# Patient Record
(demographics unavailable — no encounter records)

---

## 2025-06-26 NOTE — PHYSICAL EXAM
[No Acute Distress] : no acute distress [Well Nourished] : well nourished [Well Developed] : well developed [Well-Appearing] : well-appearing [Normal Sclera/Conjunctiva] : normal sclera/conjunctiva [PERRL] : pupils equal round and reactive to light [EOMI] : extraocular movements intact [Normal Outer Ear/Nose] : the outer ears and nose were normal in appearance [Normal Oropharynx] : the oropharynx was normal [No JVD] : no jugular venous distention [No Lymphadenopathy] : no lymphadenopathy [Supple] : supple [Thyroid Normal, No Nodules] : the thyroid was normal and there were no nodules present [No Respiratory Distress] : no respiratory distress  [No Accessory Muscle Use] : no accessory muscle use [Clear to Auscultation] : lungs were clear to auscultation bilaterally [Normal Rate] : normal rate  [Regular Rhythm] : with a regular rhythm [Normal S1, S2] : normal S1 and S2 [No Carotid Bruits] : no carotid bruits [No Abdominal Bruit] : a ~M bruit was not heard ~T in the abdomen [No Varicosities] : no varicosities [Pedal Pulses Present] : the pedal pulses are present [No Edema] : there was no peripheral edema [No Palpable Aorta] : no palpable aorta [No Extremity Clubbing/Cyanosis] : no extremity clubbing/cyanosis [Normal Appearance] : normal in appearance [Soft] : abdomen soft [Non Tender] : non-tender [Non-distended] : non-distended [No Masses] : no abdominal mass palpated [No HSM] : no HSM [Normal Bowel Sounds] : normal bowel sounds [Normal Posterior Cervical Nodes] : no posterior cervical lymphadenopathy [Normal Anterior Cervical Nodes] : no anterior cervical lymphadenopathy [No CVA Tenderness] : no CVA  tenderness [No Spinal Tenderness] : no spinal tenderness [No Joint Swelling] : no joint swelling [Grossly Normal Strength/Tone] : grossly normal strength/tone [No Rash] : no rash [Coordination Grossly Intact] : coordination grossly intact [No Focal Deficits] : no focal deficits [Normal Gait] : normal gait [Deep Tendon Reflexes (DTR)] : deep tendon reflexes were 2+ and symmetric [Normal Affect] : the affect was normal [Normal Insight/Judgement] : insight and judgment were intact [de-identified] : 2/6 sys murmur

## 2025-06-26 NOTE — HISTORY OF PRESENT ILLNESS
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 She has not been sleeping well. Working days and transitioning to nights in 2 weeks. She was working in [employer] [facility].  Periods are regular. LMP = now.  c/o some hair loss over the last 4 yrs.

## 2025-06-26 NOTE — ASSESSMENT
[FreeTextEntry1] : hair loss refer to derm [Vaccines Reviewed] : Immunizations reviewed today. Please see immunization details in the vaccine log within the immunization flowsheet.

## 2025-06-26 NOTE — HEALTH RISK ASSESSMENT
[Very Good] : ~his/her~  mood as very good [Yes] : Yes [2 - 4 times a month (2 pts)] : 2-4 times a month (2 points) [1 or 2 (0 pts)] : 1 or 2 (0 points) [Never (0 pts)] : Never (0 points) [No] : In the past 12 months have you used drugs other than those required for medical reasons? No [Little interest or pleasure doing things] : 1) Little interest or pleasure doing things [Feeling down, depressed, or hopeless] : 2) Feeling down, depressed, or hopeless [0] : 2) Feeling down, depressed, or hopeless: Not at all (0) [PHQ-2 Negative - No further assessment needed] : PHQ-2 Negative - No further assessment needed [Audit-CScore] : 2 [[id number]] : 0 [Never] : Never

## 2025-07-15 NOTE — HISTORY OF PRESENT ILLNESS
[FreeTextEntry1] : hair thinning [de-identified] : hair thinning on scalp  noted more frontally no symptoms noticing more hair shedding after covid

## 2025-07-15 NOTE — ASSESSMENT
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 Discussed proper application to affected areas of scalp, avoidance of face due to risk of hypertrichosis, need for at least 3-6 months of consistent use to see effect, possible shedding with initial use and that results are dependent on continued use of product. discussed biopsy as well to clarify pattern - pt will trial topical minoxidil first

## 2025-07-16 NOTE — ASSESSMENT
[FreeTextEntry1] : I have asked ALEM to undergo detailed cardiac testing in order to evaluate her overall cardiovascular risk. An assessment of both structural and functional heart disease was recommended to the patient. In this regard, an echocardiogram and an event recorder were advised to the patient. I await the upcoming noninvasive cardiac testing in order to assess her overall cardiovascular risk. We discussed the pros and cons of plain treadmill stress testing nuclear stress testing and angiography including a sensitivity analysis. We discussed current ACC guidelines and the calculated 10 year risk is approximately 0.9% with a vascular age of 25 which is very low risk. This represents a moderate amount of medical decision making based upon two or more chronic illnesses More than half of the face to face encounter of  60minutes was spent in counseling the patient with respect to cardiovascular risk and a heart murmur  Quality measures  Tobacco intervention not indicated Statin for prevention of cardiovascular disease not indicated Hypertension compensated Aspirin for ischemic vascular disease not indicated Tobacco screening cessation and intervention not indicated  EKG is indicated for evaluation of palpitations and heart murmur  this patient has a low risk for major adverse cardiac events.  risks benefits alternatives were discussed with the patient. all questions were answered to Her satisfaction.

## 2025-07-16 NOTE — REASON FOR VISIT
[Symptom and Test Evaluation] : symptom and test evaluation [Arrhythmia/ECG Abnorrmalities] : arrhythmia/ECG abnormalities [Structural Heart and Valve Disease] : structural heart and valve disease [FreeTextEntry3] : Dr. Melo [FreeTextEntry1] : ALEM TERRY comes today for evaluation. Her primary care provider heard a murmur and recommended a cardiology evaluation. she was advised to undergo a complete cardiac evaluation. She denies chest pains shortness of breath or loss of consciousness.  Alem was told of a mitral valve prolapse in 2015 and 2016. She comes to update her cardiovascular risk. She may have some exertional symptoms which are probably age-appropriate with exercise. Her father  of a brain cancer and her mother is alive.  She is a non-smoker nondrinker.  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

## 2025-07-16 NOTE — PHYSICAL EXAM
[Well Developed] : well developed [Well Nourished] : well nourished [No Acute Distress] : no acute distress [Normal Conjunctiva] : normal conjunctiva [Normal Venous Pressure] : normal venous pressure [No Carotid Bruit] : no carotid bruit [Normal S1, S2] : normal S1, S2 [No Rub] : no rub [No Gallop] : no gallop [Clear Lung Fields] : clear lung fields [Good Air Entry] : good air entry [No Respiratory Distress] : no respiratory distress  [Soft] : abdomen soft [Non Tender] : non-tender [No Masses/organomegaly] : no masses/organomegaly [Normal Bowel Sounds] : normal bowel sounds [Normal Gait] : normal gait [No Edema] : no edema [No Cyanosis] : no cyanosis [No Clubbing] : no clubbing [No Varicosities] : no varicosities [No Rash] : no rash [No Skin Lesions] : no skin lesions [Moves all extremities] : moves all extremities [No Focal Deficits] : no focal deficits [Normal Speech] : normal speech [Alert and Oriented] : alert and oriented [Normal memory] : normal memory [de-identified] : Grade 3/6 holosystolic murmur heard best at the base